# Patient Record
Sex: MALE | URBAN - METROPOLITAN AREA
[De-identification: names, ages, dates, MRNs, and addresses within clinical notes are randomized per-mention and may not be internally consistent; named-entity substitution may affect disease eponyms.]

---

## 2022-02-16 ENCOUNTER — LAB OUTSIDE AN ENCOUNTER (OUTPATIENT)
Dept: URBAN - METROPOLITAN AREA CLINIC 94 | Facility: CLINIC | Age: 57
End: 2022-02-16

## 2022-02-16 ENCOUNTER — WEB ENCOUNTER (OUTPATIENT)
Dept: URBAN - METROPOLITAN AREA CLINIC 94 | Facility: CLINIC | Age: 57
End: 2022-02-16

## 2022-02-16 ENCOUNTER — OFFICE VISIT (OUTPATIENT)
Dept: URBAN - METROPOLITAN AREA CLINIC 94 | Facility: CLINIC | Age: 57
End: 2022-02-16
Payer: SELF-PAY

## 2022-02-16 VITALS
TEMPERATURE: 97.2 F | WEIGHT: 168 LBS | SYSTOLIC BLOOD PRESSURE: 135 MMHG | HEART RATE: 68 BPM | HEIGHT: 68 IN | BODY MASS INDEX: 25.46 KG/M2 | DIASTOLIC BLOOD PRESSURE: 83 MMHG

## 2022-02-16 DIAGNOSIS — R79.89 ABNORMAL LFTS: ICD-10-CM

## 2022-02-16 DIAGNOSIS — R10.32 LLQ PAIN: ICD-10-CM

## 2022-02-16 DIAGNOSIS — K21.9 GASTROESOPHAGEAL REFLUX DISEASE, UNSPECIFIED WHETHER ESOPHAGITIS PRESENT: ICD-10-CM

## 2022-02-16 PROCEDURE — 99204 OFFICE O/P NEW MOD 45 MIN: CPT | Performed by: INTERNAL MEDICINE

## 2022-02-16 RX ORDER — OMEPRAZOLE 20 MG/1
1 CAPSULE 30 MINUTES BEFORE MORNING MEAL CAPSULE, DELAYED RELEASE ORAL ONCE A DAY
Status: ACTIVE | COMMUNITY

## 2022-02-16 RX ORDER — FAMOTIDINE 20 MG/1
1 TABLET AT BEDTIME AS NEEDED TABLET, FILM COATED ORAL ONCE A DAY
Status: ACTIVE | COMMUNITY

## 2022-02-16 RX ORDER — ATORVASTATIN CALCIUM 40 MG/1
1 TABLET TABLET, FILM COATED ORAL ONCE A DAY
Status: ACTIVE | COMMUNITY

## 2022-02-16 RX ORDER — BISOPROLOL FUMARATE 5 MG/1
1 TABLET TABLET ORAL ONCE A DAY
Status: ACTIVE | COMMUNITY

## 2022-02-16 RX ORDER — ASCORBIC ACID 1000 MG
AS DIRECTED TABLET ORAL
Status: ACTIVE | COMMUNITY

## 2022-02-16 NOTE — HPI-TODAY'S VISIT:
56 yr male presents for evaluation of chronic GERD, non cardiogenic chest pain and abdominal pain. Pt has chronic GERD for > 5 years. States that symptoms were previously well controlled with omeprazole, but symptomS have worsened over last few months with daily episodes of heartburn and reflux. Pt has chest pains since last year. He was seen by cardiology last year and states that cardiac w/u was negative.  Pt recently evaluated at [facility] 2/9/22 for chest pains. Cardiac w/u was again negative. Labs showed slightly elevated LFTs with , ALT 90, Bilirubin 1.4. Pt admits to heavy ETOH use in past, but quit 2 weeks ago. RUQ US showed fatty liver and gallbladder sludge. Pt states that ER added pepcid 20 mg BID to omeprazole 20 mg/day. Since then his symptoms have improved but not resolved. Pt also C/O intermittent LLQ pain. Pain is dull, can last from few minutes to an hour, intermittent , not related to BM. Stools normal. States that had a 3mm colon polyp removed by Dr Johns during colonoscopy ~ 5 years ago and was told to come back in 10 years.

## 2022-02-17 LAB
A/G RATIO: 2
ALBUMIN: 4.8
ALKALINE PHOSPHATASE: 79
ALT (SGPT): 60
AST (SGOT): 52
BILIRUBIN, TOTAL: 0.5
BUN/CREATININE RATIO: 12
BUN: 11
CALCIUM: 9.7
CARBON DIOXIDE, TOTAL: 23
CHLORIDE: 99
CREATININE: 0.92
EGFR IF AFRICN AM: 108
EGFR IF NONAFRICN AM: 93
GLOBULIN, TOTAL: 2.4
GLUCOSE: 107
HBSAG SCREEN: NEGATIVE
HCV AB: <0.1
HEP A AB, IGM: NEGATIVE
HEP B CORE AB, IGM: NEGATIVE
INTERPRETATION:: (no result)
POTASSIUM: 5.1
PROTEIN, TOTAL: 7.2
SODIUM: 141

## 2022-03-17 ENCOUNTER — OFFICE VISIT (OUTPATIENT)
Dept: URBAN - METROPOLITAN AREA SURGERY CENTER 17 | Facility: SURGERY CENTER | Age: 57
End: 2022-03-17
Payer: SELF-PAY

## 2022-03-17 DIAGNOSIS — K22.89 ESOPHAGEAL BLEED, NON-VARICEAL: ICD-10-CM

## 2022-03-17 DIAGNOSIS — K29.60 ADENOPAPILLOMATOSIS GASTRICA: ICD-10-CM

## 2022-03-17 DIAGNOSIS — R10.13 ABDOMINAL DISCOMFORT, EPIGASTRIC: ICD-10-CM

## 2022-03-17 PROCEDURE — G8907 PT DOC NO EVENTS ON DISCHARG: HCPCS | Performed by: INTERNAL MEDICINE

## 2022-03-17 PROCEDURE — 43239 EGD BIOPSY SINGLE/MULTIPLE: CPT | Performed by: INTERNAL MEDICINE

## 2022-03-17 RX ORDER — FAMOTIDINE 20 MG/1
1 TABLET AT BEDTIME AS NEEDED TABLET, FILM COATED ORAL ONCE A DAY
Status: ACTIVE | COMMUNITY

## 2022-03-17 RX ORDER — ASCORBIC ACID 1000 MG
AS DIRECTED TABLET ORAL
Status: ACTIVE | COMMUNITY

## 2022-03-17 RX ORDER — BISOPROLOL FUMARATE 5 MG/1
1 TABLET TABLET ORAL ONCE A DAY
Status: ACTIVE | COMMUNITY

## 2022-03-17 RX ORDER — PANTOPRAZOLE SODIUM 40 MG/1
1 TABLET TABLET, DELAYED RELEASE ORAL ONCE A DAY
Qty: 30 TABLET | Refills: 3 | OUTPATIENT
Start: 2022-03-17

## 2022-03-17 RX ORDER — ATORVASTATIN CALCIUM 40 MG/1
1 TABLET TABLET, FILM COATED ORAL ONCE A DAY
Status: ACTIVE | COMMUNITY

## 2022-03-17 RX ORDER — OMEPRAZOLE 20 MG/1
1 CAPSULE 30 MINUTES BEFORE MORNING MEAL CAPSULE, DELAYED RELEASE ORAL ONCE A DAY
Status: ACTIVE | COMMUNITY

## 2022-04-04 ENCOUNTER — OFFICE VISIT (OUTPATIENT)
Dept: URBAN - METROPOLITAN AREA CLINIC 94 | Facility: CLINIC | Age: 57
End: 2022-04-04

## 2022-04-18 ENCOUNTER — OFFICE VISIT (OUTPATIENT)
Dept: URBAN - METROPOLITAN AREA CLINIC 94 | Facility: CLINIC | Age: 57
End: 2022-04-18
Payer: SELF-PAY

## 2022-04-18 ENCOUNTER — WEB ENCOUNTER (OUTPATIENT)
Dept: URBAN - METROPOLITAN AREA CLINIC 94 | Facility: CLINIC | Age: 57
End: 2022-04-18

## 2022-04-18 VITALS
BODY MASS INDEX: 24.71 KG/M2 | SYSTOLIC BLOOD PRESSURE: 114 MMHG | TEMPERATURE: 97.9 F | DIASTOLIC BLOOD PRESSURE: 70 MMHG | HEIGHT: 68 IN | WEIGHT: 163 LBS | HEART RATE: 80 BPM

## 2022-04-18 DIAGNOSIS — K21.9 GASTROESOPHAGEAL REFLUX DISEASE, UNSPECIFIED WHETHER ESOPHAGITIS PRESENT: ICD-10-CM

## 2022-04-18 DIAGNOSIS — R10.32 LLQ PAIN: ICD-10-CM

## 2022-04-18 DIAGNOSIS — K29.50 CHRONIC GASTRITIS WITHOUT BLEEDING, UNSPECIFIED GASTRITIS TYPE: ICD-10-CM

## 2022-04-18 DIAGNOSIS — R79.89 ABNORMAL LFTS: ICD-10-CM

## 2022-04-18 PROBLEM — 8493009: Status: ACTIVE | Noted: 2022-04-18

## 2022-04-18 PROBLEM — 235595009: Status: ACTIVE | Noted: 2022-02-16

## 2022-04-18 PROCEDURE — 99213 OFFICE O/P EST LOW 20 MIN: CPT | Performed by: PHYSICIAN ASSISTANT

## 2022-04-18 RX ORDER — ATORVASTATIN CALCIUM 40 MG/1
1 TABLET TABLET, FILM COATED ORAL ONCE A DAY
Status: ACTIVE | COMMUNITY

## 2022-04-18 RX ORDER — FAMOTIDINE 20 MG/1
1 TABLET AT BEDTIME AS NEEDED TABLET, FILM COATED ORAL ONCE A DAY
Status: ACTIVE | COMMUNITY

## 2022-04-18 RX ORDER — ASCORBIC ACID 1000 MG
AS DIRECTED TABLET ORAL
Status: ACTIVE | COMMUNITY

## 2022-04-18 RX ORDER — OMEPRAZOLE 20 MG/1
1 CAPSULE 30 MINUTES BEFORE MORNING MEAL CAPSULE, DELAYED RELEASE ORAL ONCE A DAY
Status: ACTIVE | COMMUNITY

## 2022-04-18 RX ORDER — BISOPROLOL FUMARATE 5 MG/1
1 TABLET TABLET ORAL ONCE A DAY
Status: ACTIVE | COMMUNITY

## 2022-04-18 RX ORDER — PANTOPRAZOLE SODIUM 40 MG/1
1 TABLET TABLET, DELAYED RELEASE ORAL ONCE A DAY
Qty: 30 TABLET | Refills: 3 | Status: ACTIVE | COMMUNITY
Start: 2022-03-17

## 2022-04-18 NOTE — HPI-TODAY'S VISIT:
56 yr M evaluated today for post procedure f/u visit with hx of chronic GERD, non-cardiogenic chest pain and abdominal pain.  EGD 3/2022 esophageal and gastric erythema, normal duodenum Bx reveals chronic inflammation of esophagus without Randhawa's Esophagus, candida, or EoE, Gastric Bx reveals chronic inflammation without H. Pylori.   CT A/P no acute abnormalities.   Today patient states that since his last visit, he still denies any ETOH use. He is monitoring his diet closely avoiding dairy, spicy/tomato based, and fried or high fat foods. Pt continues Protonix 40 mg daily. Pt denies acid reflux sx as long as he takes medications and avoids offensive foods. He also denies LLQ pain which he also attributes to diet.   Pt has not had repeat LFTs since his last visit. AST/ALT remained elevated. See below.    Pt has chronic GERD for > 5 years.  Pt recently evaluated at [facility] 2/9/22 for chest pains. Cardiac w/u was again negative. Labs showed slightly elevated LFTs with , ALT 90, Bilirubin 1.4. Pt admits to heavy ETOH use in past, but quit 2 weeks ago. RUQ US showed fatty liver and gallbladder sludge.  Colonoscopy 2015 3 mm polyp -hyperplastic , moderate diverticulsis of sogmoid colon. 10 yr surveillance colonoscopy.

## 2022-07-12 ENCOUNTER — DASHBOARD ENCOUNTERS (OUTPATIENT)
Age: 57
End: 2022-07-12

## 2022-07-18 ENCOUNTER — OFFICE VISIT (OUTPATIENT)
Dept: URBAN - METROPOLITAN AREA CLINIC 94 | Facility: CLINIC | Age: 57
End: 2022-07-18

## 2022-07-18 RX ORDER — FAMOTIDINE 20 MG/1
1 TABLET AT BEDTIME AS NEEDED TABLET, FILM COATED ORAL ONCE A DAY
Status: ACTIVE | COMMUNITY

## 2022-07-18 RX ORDER — ASCORBIC ACID 1000 MG
AS DIRECTED TABLET ORAL
Status: ACTIVE | COMMUNITY

## 2022-07-18 RX ORDER — OMEPRAZOLE 20 MG/1
1 CAPSULE 30 MINUTES BEFORE MORNING MEAL CAPSULE, DELAYED RELEASE ORAL ONCE A DAY
Status: ACTIVE | COMMUNITY

## 2022-07-18 RX ORDER — BISOPROLOL FUMARATE 5 MG/1
1 TABLET TABLET ORAL ONCE A DAY
Status: ACTIVE | COMMUNITY

## 2022-07-18 RX ORDER — ATORVASTATIN CALCIUM 40 MG/1
1 TABLET TABLET, FILM COATED ORAL ONCE A DAY
Status: ACTIVE | COMMUNITY

## 2022-07-18 RX ORDER — PANTOPRAZOLE SODIUM 40 MG/1
1 TABLET TABLET, DELAYED RELEASE ORAL ONCE A DAY
Qty: 30 TABLET | Refills: 3 | Status: ACTIVE | COMMUNITY
Start: 2022-03-17

## 2023-02-01 ENCOUNTER — APPOINTMENT (RX ONLY)
Dept: URBAN - METROPOLITAN AREA CLINIC 41 | Facility: CLINIC | Age: 58
Setting detail: DERMATOLOGY
End: 2023-02-01

## 2023-02-01 DIAGNOSIS — D22 MELANOCYTIC NEVI: ICD-10-CM

## 2023-02-01 DIAGNOSIS — D18.0 HEMANGIOMA: ICD-10-CM

## 2023-02-01 DIAGNOSIS — I99.8 OTHER DISORDER OF CIRCULATORY SYSTEM: ICD-10-CM

## 2023-02-01 DIAGNOSIS — L82.1 OTHER SEBORRHEIC KERATOSIS: ICD-10-CM

## 2023-02-01 DIAGNOSIS — L91.8 OTHER HYPERTROPHIC DISORDERS OF THE SKIN: ICD-10-CM

## 2023-02-01 DIAGNOSIS — L57.3 POIKILODERMA OF CIVATTE: ICD-10-CM

## 2023-02-01 DIAGNOSIS — L71.8 OTHER ROSACEA: ICD-10-CM

## 2023-02-01 PROBLEM — D22.5 MELANOCYTIC NEVI OF TRUNK: Status: ACTIVE | Noted: 2023-02-01

## 2023-02-01 PROBLEM — D18.01 HEMANGIOMA OF SKIN AND SUBCUTANEOUS TISSUE: Status: ACTIVE | Noted: 2023-02-01

## 2023-02-01 PROCEDURE — ? COUNSELING

## 2023-02-01 PROCEDURE — ? TREATMENT REGIMEN

## 2023-02-01 PROCEDURE — ? PRESCRIPTION

## 2023-02-01 PROCEDURE — 99204 OFFICE O/P NEW MOD 45 MIN: CPT

## 2023-02-01 PROCEDURE — ? SUNSCREEN RECOMMENDATIONS

## 2023-02-01 RX ORDER — CLINDAMYCIN PHOSPHATE 10 MG/ML
LOTION TOPICAL BID
Qty: 60 | Refills: 2 | Status: ERX | COMMUNITY
Start: 2023-02-01

## 2023-02-01 RX ORDER — DOXYCYCLINE HYCLATE 50 MG/1
CAPSULE, GELATIN COATED ORAL BID
Qty: 60 | Refills: 1 | Status: ERX | COMMUNITY
Start: 2023-02-01

## 2023-02-01 RX ADMIN — DOXYCYCLINE HYCLATE: 50 CAPSULE, GELATIN COATED ORAL at 00:00

## 2023-02-01 RX ADMIN — CLINDAMYCIN PHOSPHATE: 10 LOTION TOPICAL at 00:00

## 2023-02-01 ASSESSMENT — LOCATION ZONE DERM
LOCATION ZONE: ARM
LOCATION ZONE: AXILLAE
LOCATION ZONE: FACE
LOCATION ZONE: TRUNK
LOCATION ZONE: NECK

## 2023-02-01 ASSESSMENT — LOCATION SIMPLE DESCRIPTION DERM
LOCATION SIMPLE: LEFT AXILLARY VAULT
LOCATION SIMPLE: CHEST
LOCATION SIMPLE: RIGHT UPPER BACK
LOCATION SIMPLE: ABDOMEN
LOCATION SIMPLE: POSTERIOR NECK
LOCATION SIMPLE: SUPERIOR FOREHEAD
LOCATION SIMPLE: LEFT UPPER ARM
LOCATION SIMPLE: RIGHT CHEEK
LOCATION SIMPLE: LEFT SHOULDER
LOCATION SIMPLE: RIGHT LOWER BACK
LOCATION SIMPLE: RIGHT SHOULDER
LOCATION SIMPLE: RIGHT ANTERIOR NECK
LOCATION SIMPLE: RIGHT AXILLARY VAULT
LOCATION SIMPLE: LEFT CHEEK
LOCATION SIMPLE: RIGHT UPPER ARM

## 2023-02-01 ASSESSMENT — LOCATION DETAILED DESCRIPTION DERM
LOCATION DETAILED: RIGHT MID-UPPER BACK
LOCATION DETAILED: LEFT AXILLARY VAULT
LOCATION DETAILED: MID POSTERIOR NECK
LOCATION DETAILED: LEFT ANTERIOR SHOULDER
LOCATION DETAILED: RIGHT CENTRAL MALAR CHEEK
LOCATION DETAILED: LEFT LATERAL ABDOMEN
LOCATION DETAILED: SUPERIOR MID FOREHEAD
LOCATION DETAILED: LEFT CENTRAL MALAR CHEEK
LOCATION DETAILED: RIGHT AXILLARY VAULT
LOCATION DETAILED: RIGHT ANTERIOR PROXIMAL UPPER ARM
LOCATION DETAILED: RIGHT SUPERIOR MEDIAL MIDBACK
LOCATION DETAILED: RIGHT SUPERIOR ANTERIOR NECK
LOCATION DETAILED: LEFT MEDIAL SUPERIOR CHEST
LOCATION DETAILED: LEFT ANTERIOR PROXIMAL UPPER ARM
LOCATION DETAILED: RIGHT ANTERIOR SHOULDER
LOCATION DETAILED: EPIGASTRIC SKIN

## 2023-02-01 NOTE — PROCEDURE: TREATMENT REGIMEN
Otc Regimen: BPO wash every other AM
Detail Level: Zone
Initiate Treatment: Doxycycline 50 mg bid\\nClindamycin lotion bid